# Patient Record
Sex: FEMALE | Race: WHITE | Employment: FULL TIME | URBAN - METROPOLITAN AREA
[De-identification: names, ages, dates, MRNs, and addresses within clinical notes are randomized per-mention and may not be internally consistent; named-entity substitution may affect disease eponyms.]

---

## 2018-03-02 ENCOUNTER — TELEPHONE (OUTPATIENT)
Dept: INTERNAL MEDICINE CLINIC | Facility: CLINIC | Age: 48
End: 2018-03-02

## 2018-03-02 NOTE — TELEPHONE ENCOUNTER
Incoming (mail or fax):  fax  Received from:  [facility]  Documentation given to:  Dr Nirmala Marin

## 2018-08-09 NOTE — TELEPHONE ENCOUNTER
Incoming (mail or fax):  Fax  Received from: [facility]  Documentation given to: Lucien Ramos

## 2018-11-29 ENCOUNTER — APPOINTMENT (OUTPATIENT)
Dept: CT IMAGING | Age: 48
End: 2018-11-29
Attending: EMERGENCY MEDICINE
Payer: COMMERCIAL

## 2018-11-29 ENCOUNTER — HOSPITAL ENCOUNTER (EMERGENCY)
Age: 48
Discharge: HOME | End: 2018-11-29
Attending: EMERGENCY MEDICINE
Payer: COMMERCIAL

## 2018-11-29 ENCOUNTER — APPOINTMENT (OUTPATIENT)
Dept: CV DIAGNOSTICS | Age: 48
End: 2018-11-29
Attending: EMERGENCY MEDICINE
Payer: COMMERCIAL

## 2018-11-29 VITALS
OXYGEN SATURATION: 98 % | TEMPERATURE: 99 F | RESPIRATION RATE: 20 BRPM | DIASTOLIC BLOOD PRESSURE: 48 MMHG | HEIGHT: 64 IN | WEIGHT: 235 LBS | BODY MASS INDEX: 40.12 KG/M2 | HEART RATE: 85 BPM | SYSTOLIC BLOOD PRESSURE: 104 MMHG

## 2018-11-29 DIAGNOSIS — R07.9 CHEST PAIN OF UNCERTAIN ETIOLOGY: Primary | ICD-10-CM

## 2018-11-29 PROCEDURE — 93018 CV STRESS TEST I&R ONLY: CPT | Performed by: EMERGENCY MEDICINE

## 2018-11-29 PROCEDURE — 93350 STRESS TTE ONLY: CPT | Performed by: EMERGENCY MEDICINE

## 2018-11-29 PROCEDURE — 81003 URINALYSIS AUTO W/O SCOPE: CPT | Performed by: EMERGENCY MEDICINE

## 2018-11-29 PROCEDURE — 93017 CV STRESS TEST TRACING ONLY: CPT | Performed by: EMERGENCY MEDICINE

## 2018-11-29 PROCEDURE — 85025 COMPLETE CBC W/AUTO DIFF WBC: CPT | Performed by: EMERGENCY MEDICINE

## 2018-11-29 PROCEDURE — 80053 COMPREHEN METABOLIC PANEL: CPT | Performed by: EMERGENCY MEDICINE

## 2018-11-29 PROCEDURE — 93005 ELECTROCARDIOGRAM TRACING: CPT

## 2018-11-29 PROCEDURE — 81025 URINE PREGNANCY TEST: CPT | Performed by: EMERGENCY MEDICINE

## 2018-11-29 PROCEDURE — 93010 ELECTROCARDIOGRAM REPORT: CPT | Performed by: EMERGENCY MEDICINE

## 2018-11-29 PROCEDURE — 96360 HYDRATION IV INFUSION INIT: CPT | Performed by: EMERGENCY MEDICINE

## 2018-11-29 PROCEDURE — 99285 EMERGENCY DEPT VISIT HI MDM: CPT | Performed by: EMERGENCY MEDICINE

## 2018-11-29 PROCEDURE — 71275 CT ANGIOGRAPHY CHEST: CPT | Performed by: EMERGENCY MEDICINE

## 2018-11-29 PROCEDURE — 84484 ASSAY OF TROPONIN QUANT: CPT | Performed by: EMERGENCY MEDICINE

## 2018-11-29 NOTE — ED PROVIDER NOTES
Patient Seen in: THE [facility] Emergency Department In [place]    History   Patient presents with:  Chest Pain Angina (cardiovascular)    Stated Complaint: chest heaviness    HPI    53-year-old female presents with chest discomfort and feeling \"off\".   She Current:/48   Pulse 85   Temp 98.7 °F (37.1 °C) (Temporal)   Resp 20   Ht 162.6 cm (5' 4\")   Wt 106.6 kg   SpO2 98%   BMI 40.34 kg/m²         Physical Exam    General:  Vitals as listed. Appears anxious  HEENT: Sclerae anicteric.   Conjunctiva ischemia              Ct Angiography, Chest (cpt=71275)    Result Date: 11/29/2018  CONCLUSION:  No CT evidence for pulmonary embolism. 4.5 mm subpleural nodule in the right lower lobe. Bibasilar atelectasis.   Dictated by: Ria Painter MD on 11/29/2018 [address] [address] [phone]    Schedule an appointment as soon as possible for a visit      Bakari Aguilar MD  [address] [address] [phone]      cardiolo

## 2020-12-04 ENCOUNTER — OFFICE VISIT (OUTPATIENT)
Dept: INTERNAL MEDICINE CLINIC | Facility: CLINIC | Age: 50
End: 2020-12-04
Payer: COMMERCIAL

## 2020-12-04 VITALS
OXYGEN SATURATION: 98 % | HEIGHT: 63.75 IN | TEMPERATURE: 97 F | RESPIRATION RATE: 14 BRPM | WEIGHT: 228.81 LBS | HEART RATE: 90 BPM | DIASTOLIC BLOOD PRESSURE: 68 MMHG | BODY MASS INDEX: 39.55 KG/M2 | SYSTOLIC BLOOD PRESSURE: 116 MMHG

## 2020-12-04 DIAGNOSIS — Z12.31 ENCOUNTER FOR SCREENING MAMMOGRAM FOR BREAST CANCER: ICD-10-CM

## 2020-12-04 DIAGNOSIS — K43.9 VENTRAL HERNIA WITHOUT OBSTRUCTION OR GANGRENE: Primary | ICD-10-CM

## 2020-12-04 DIAGNOSIS — Z01.89 ENCOUNTER FOR LABORATORY EXAMINATION: ICD-10-CM

## 2020-12-04 PROCEDURE — 3074F SYST BP LT 130 MM HG: CPT | Performed by: INTERNAL MEDICINE

## 2020-12-04 PROCEDURE — 3078F DIAST BP <80 MM HG: CPT | Performed by: INTERNAL MEDICINE

## 2020-12-04 PROCEDURE — 3008F BODY MASS INDEX DOCD: CPT | Performed by: INTERNAL MEDICINE

## 2020-12-04 PROCEDURE — 99203 OFFICE O/P NEW LOW 30 MIN: CPT | Performed by: INTERNAL MEDICINE

## 2020-12-04 NOTE — PROGRESS NOTES
Yovany Reilly is a 52year old female  Patient presents with:  Hernia: Abd Hernia - Changed Shape - Noticed change about 6 mos ago      HPI:   She has a hernia since childhood, ventral, was always very small and has noticed it has gotten bigger when sh screening mammogram for breast cancer    Orders Placed This Encounter      CMP      CBC With Differential With Platelet      Lipid Panel      TSH W Reflex To Free T4      Meds & Refills for this Visit:  Requested Prescriptions      No prescriptions request

## 2020-12-09 ENCOUNTER — HOSPITAL ENCOUNTER (OUTPATIENT)
Dept: MAMMOGRAPHY | Age: 50
Discharge: HOME | End: 2020-12-09
Attending: INTERNAL MEDICINE
Payer: COMMERCIAL

## 2020-12-09 DIAGNOSIS — Z12.31 ENCOUNTER FOR SCREENING MAMMOGRAM FOR BREAST CANCER: ICD-10-CM

## 2020-12-09 PROCEDURE — 77067 SCR MAMMO BI INCL CAD: CPT | Performed by: INTERNAL MEDICINE

## 2020-12-09 PROCEDURE — 77063 BREAST TOMOSYNTHESIS BI: CPT | Performed by: INTERNAL MEDICINE

## 2020-12-14 ENCOUNTER — HOSPITAL ENCOUNTER (OUTPATIENT)
Dept: ULTRASOUND IMAGING | Age: 50
Discharge: HOME | End: 2020-12-14
Attending: INTERNAL MEDICINE
Payer: COMMERCIAL

## 2020-12-14 ENCOUNTER — HOSPITAL ENCOUNTER (OUTPATIENT)
Dept: MAMMOGRAPHY | Age: 50
Discharge: HOME | End: 2020-12-14
Attending: INTERNAL MEDICINE
Payer: COMMERCIAL

## 2020-12-14 DIAGNOSIS — R92.2 INCONCLUSIVE MAMMOGRAM: ICD-10-CM

## 2020-12-14 DIAGNOSIS — R92.8 ABNORMAL MAMMOGRAM OF RIGHT BREAST: ICD-10-CM

## 2020-12-14 DIAGNOSIS — R92.8 ABNORMAL ULTRASOUND OF BREAST: Primary | ICD-10-CM

## 2020-12-14 PROCEDURE — 77066 DX MAMMO INCL CAD BI: CPT | Performed by: INTERNAL MEDICINE

## 2020-12-14 PROCEDURE — 76641 ULTRASOUND BREAST COMPLETE: CPT | Performed by: INTERNAL MEDICINE

## 2020-12-14 PROCEDURE — 77062 BREAST TOMOSYNTHESIS BI: CPT | Performed by: INTERNAL MEDICINE

## 2020-12-23 ENCOUNTER — LAB ENCOUNTER (OUTPATIENT)
Dept: LAB | Age: 50
End: 2020-12-23
Attending: INTERNAL MEDICINE
Payer: COMMERCIAL

## 2020-12-23 DIAGNOSIS — Z01.89 ENCOUNTER FOR LABORATORY EXAMINATION: ICD-10-CM

## 2020-12-23 PROCEDURE — 36415 COLL VENOUS BLD VENIPUNCTURE: CPT

## 2020-12-23 PROCEDURE — 84443 ASSAY THYROID STIM HORMONE: CPT

## 2020-12-23 PROCEDURE — 80061 LIPID PANEL: CPT

## 2020-12-23 PROCEDURE — 80053 COMPREHEN METABOLIC PANEL: CPT

## 2020-12-23 PROCEDURE — 85025 COMPLETE CBC W/AUTO DIFF WBC: CPT

## 2020-12-28 ENCOUNTER — OFFICE VISIT (OUTPATIENT)
Dept: SURGERY | Facility: CLINIC | Age: 50
End: 2020-12-28
Payer: COMMERCIAL

## 2020-12-28 VITALS
HEART RATE: 93 BPM | SYSTOLIC BLOOD PRESSURE: 129 MMHG | TEMPERATURE: 97 F | BODY MASS INDEX: 38.93 KG/M2 | WEIGHT: 228 LBS | DIASTOLIC BLOOD PRESSURE: 74 MMHG | HEIGHT: 64 IN

## 2020-12-28 DIAGNOSIS — K43.9 EPIGASTRIC HERNIA: Primary | ICD-10-CM

## 2020-12-28 PROCEDURE — 3078F DIAST BP <80 MM HG: CPT | Performed by: COLON & RECTAL SURGERY

## 2020-12-28 PROCEDURE — 3008F BODY MASS INDEX DOCD: CPT | Performed by: COLON & RECTAL SURGERY

## 2020-12-28 PROCEDURE — 99243 OFF/OP CNSLTJ NEW/EST LOW 30: CPT | Performed by: COLON & RECTAL SURGERY

## 2020-12-28 PROCEDURE — 3074F SYST BP LT 130 MM HG: CPT | Performed by: COLON & RECTAL SURGERY

## 2020-12-28 RX ORDER — AMOXICILLIN AND CLAVULANATE POTASSIUM 875; 125 MG/1; MG/1
TABLET, FILM COATED ORAL EVERY 12 HOURS
COMMUNITY
Start: 2020-12-26 | End: 2021-01-08

## 2020-12-28 NOTE — H&P
New Patient Visit Note       Active Problems      1.  Epigastric hernia        Chief Complaint   Patient presents with:  Hernia      History of Present Illness   Anika Sutton is a 54year old female referred by Dr. Spencer Stewart for evaluation of herni History    Tobacco Use      Smoking status: Never Smoker      Smokeless tobacco: Never Used    Alcohol use: Yes      Frequency: 2-4 times a month      Drinks per session: 5 or 6      Comment: social    Drug use: No     No current outpatient medications on with reducible contents, likely bowel. ~2 -3 cm palpable fascial defect. Musculoskeletal: Normal range of motion. General: No edema. Lymphadenopathy:     She has no cervical adenopathy.    Neurological: She is alert and oriented to person, place

## 2020-12-28 NOTE — H&P (VIEW-ONLY)
New Patient Visit Note       Active Problems      1.  Epigastric hernia        Chief Complaint   Patient presents with:  Hernia      History of Present Illness   Angie Patel is a 54year old female referred by Dr. Rivka Ramirez for evaluation of herni History    Tobacco Use      Smoking status: Never Smoker      Smokeless tobacco: Never Used    Alcohol use: Yes      Frequency: 2-4 times a month      Drinks per session: 5 or 6      Comment: social    Drug use: No     No current outpatient medications on with reducible contents, likely bowel. ~2 -3 cm palpable fascial defect. Musculoskeletal: Normal range of motion. General: No edema. Lymphadenopathy:     She has no cervical adenopathy.    Neurological: She is alert and oriented to person, place

## 2021-01-06 ENCOUNTER — TELEPHONE (OUTPATIENT)
Dept: SURGERY | Facility: CLINIC | Age: 51
End: 2021-01-06

## 2021-01-06 NOTE — TELEPHONE ENCOUNTER
XOJET and spoke with [facility] SERVICES for prior authorization for CPT 97368 CT ABDOMEN AND PELVIS W CONTRAST. Call was transferred to [facility]. Spoke with Justa Handley. Case approved. Valid from 1/6-7/5/2021. Auth number: [id number]. Referral updated.  Patient Vito Vasquez

## 2021-01-08 ENCOUNTER — HOSPITAL ENCOUNTER (OUTPATIENT)
Dept: CT IMAGING | Age: 51
Discharge: HOME | End: 2021-01-08
Attending: COLON & RECTAL SURGERY
Payer: COMMERCIAL

## 2021-01-08 DIAGNOSIS — K43.9 EPIGASTRIC HERNIA: ICD-10-CM

## 2021-01-08 PROCEDURE — 74177 CT ABD & PELVIS W/CONTRAST: CPT | Performed by: COLON & RECTAL SURGERY

## 2021-01-08 RX ORDER — ACETAMINOPHEN 500 MG
1000 TABLET ORAL ONCE
Status: CANCELLED | OUTPATIENT
Start: 2021-01-08 | End: 2021-01-08

## 2021-01-19 ENCOUNTER — LAB ENCOUNTER (OUTPATIENT)
Dept: LAB | Age: 51
End: 2021-01-19
Attending: COLON & RECTAL SURGERY
Payer: COMMERCIAL

## 2021-01-19 DIAGNOSIS — K43.9 EPIGASTRIC HERNIA: ICD-10-CM

## 2021-01-20 LAB — SARS-COV-2 RNA RESP QL NAA+PROBE: NOT DETECTED

## 2021-01-22 ENCOUNTER — ANESTHESIA EVENT (OUTPATIENT)
Dept: SURGERY | Facility: HOSPITAL | Age: 51
End: 2021-01-22
Payer: COMMERCIAL

## 2021-01-22 ENCOUNTER — ANESTHESIA (OUTPATIENT)
Dept: SURGERY | Facility: HOSPITAL | Age: 51
End: 2021-01-22
Payer: COMMERCIAL

## 2021-01-22 ENCOUNTER — HOSPITAL ENCOUNTER (OUTPATIENT)
Facility: HOSPITAL | Age: 51
Setting detail: HOSPITAL OUTPATIENT SURGERY
Discharge: HOME | End: 2021-01-22
Attending: COLON & RECTAL SURGERY | Admitting: COLON & RECTAL SURGERY
Payer: COMMERCIAL

## 2021-01-22 VITALS
SYSTOLIC BLOOD PRESSURE: 106 MMHG | TEMPERATURE: 98 F | RESPIRATION RATE: 16 BRPM | WEIGHT: 221.81 LBS | BODY MASS INDEX: 37.87 KG/M2 | OXYGEN SATURATION: 95 % | HEIGHT: 64 IN | HEART RATE: 68 BPM | DIASTOLIC BLOOD PRESSURE: 63 MMHG

## 2021-01-22 DIAGNOSIS — K43.9 EPIGASTRIC HERNIA: Primary | ICD-10-CM

## 2021-01-22 LAB
EXPIRATION DATE: 2022
POCT LOT NUMBER: NORMAL
POCT URINE PREGNANCY: NEGATIVE

## 2021-01-22 PROCEDURE — 8E0W4CZ ROBOTIC ASSISTED PROCEDURE OF TRUNK REGION, PERCUTANEOUS ENDOSCOPIC APPROACH: ICD-10-PCS | Performed by: COLON & RECTAL SURGERY

## 2021-01-22 PROCEDURE — 0WUF4JZ SUPPLEMENT ABDOMINAL WALL WITH SYNTHETIC SUBSTITUTE, PERCUTANEOUS ENDOSCOPIC APPROACH: ICD-10-PCS | Performed by: COLON & RECTAL SURGERY

## 2021-01-22 PROCEDURE — 49653 LAP VENT/ABD HERN PROC COMP: CPT | Performed by: COLON & RECTAL SURGERY

## 2021-01-22 PROCEDURE — 0WQF4ZZ REPAIR ABDOMINAL WALL, PERCUTANEOUS ENDOSCOPIC APPROACH: ICD-10-PCS | Performed by: COLON & RECTAL SURGERY

## 2021-01-22 PROCEDURE — 49653 LAP VENT/ABD HERN PROC COMP: CPT | Performed by: PHYSICIAN ASSISTANT

## 2021-01-22 DEVICE — VENTRALIGHT ST MESH
Type: IMPLANTABLE DEVICE | Site: ABDOMEN | Status: FUNCTIONAL
Brand: VENTRALIGHT ST

## 2021-01-22 RX ORDER — MEPERIDINE HYDROCHLORIDE 25 MG/ML
12.5 INJECTION INTRAMUSCULAR; INTRAVENOUS; SUBCUTANEOUS AS NEEDED
Status: DISCONTINUED | OUTPATIENT
Start: 2021-01-22 | End: 2021-01-22

## 2021-01-22 RX ORDER — GLYCOPYRROLATE 0.2 MG/ML
INJECTION, SOLUTION INTRAMUSCULAR; INTRAVENOUS AS NEEDED
Status: DISCONTINUED | OUTPATIENT
Start: 2021-01-22 | End: 2021-01-22 | Stop reason: SURG

## 2021-01-22 RX ORDER — DIPHENHYDRAMINE HYDROCHLORIDE 50 MG/ML
12.5 INJECTION INTRAMUSCULAR; INTRAVENOUS AS NEEDED
Status: DISCONTINUED | OUTPATIENT
Start: 2021-01-22 | End: 2021-01-22

## 2021-01-22 RX ORDER — HEPARIN SODIUM 5000 [USP'U]/ML
5000 INJECTION, SOLUTION INTRAVENOUS; SUBCUTANEOUS ONCE
Status: COMPLETED | OUTPATIENT
Start: 2021-01-22 | End: 2021-01-22

## 2021-01-22 RX ORDER — KETOROLAC TROMETHAMINE 30 MG/ML
INJECTION, SOLUTION INTRAMUSCULAR; INTRAVENOUS AS NEEDED
Status: DISCONTINUED | OUTPATIENT
Start: 2021-01-22 | End: 2021-01-22 | Stop reason: SURG

## 2021-01-22 RX ORDER — PHENYLEPHRINE HCL 10 MG/ML
VIAL (ML) INJECTION AS NEEDED
Status: DISCONTINUED | OUTPATIENT
Start: 2021-01-22 | End: 2021-01-22 | Stop reason: SURG

## 2021-01-22 RX ORDER — LIDOCAINE HYDROCHLORIDE 10 MG/ML
INJECTION, SOLUTION EPIDURAL; INFILTRATION; INTRACAUDAL; PERINEURAL AS NEEDED
Status: DISCONTINUED | OUTPATIENT
Start: 2021-01-22 | End: 2021-01-22 | Stop reason: SURG

## 2021-01-22 RX ORDER — ACETAMINOPHEN 500 MG
1000 TABLET ORAL EVERY 6 HOURS PRN
Status: DISCONTINUED | OUTPATIENT
Start: 2021-01-22 | End: 2021-01-22

## 2021-01-22 RX ORDER — MIDAZOLAM HYDROCHLORIDE 1 MG/ML
1 INJECTION INTRAMUSCULAR; INTRAVENOUS EVERY 5 MIN PRN
Status: DISCONTINUED | OUTPATIENT
Start: 2021-01-22 | End: 2021-01-22

## 2021-01-22 RX ORDER — ONDANSETRON 2 MG/ML
4 INJECTION INTRAMUSCULAR; INTRAVENOUS AS NEEDED
Status: DISCONTINUED | OUTPATIENT
Start: 2021-01-22 | End: 2021-01-22

## 2021-01-22 RX ORDER — SODIUM CHLORIDE, SODIUM LACTATE, POTASSIUM CHLORIDE, CALCIUM CHLORIDE 600; 310; 30; 20 MG/100ML; MG/100ML; MG/100ML; MG/100ML
INJECTION, SOLUTION INTRAVENOUS CONTINUOUS
Status: DISCONTINUED | OUTPATIENT
Start: 2021-01-22 | End: 2021-01-22

## 2021-01-22 RX ORDER — MIDAZOLAM HYDROCHLORIDE 1 MG/ML
INJECTION INTRAMUSCULAR; INTRAVENOUS AS NEEDED
Status: DISCONTINUED | OUTPATIENT
Start: 2021-01-22 | End: 2021-01-22 | Stop reason: SURG

## 2021-01-22 RX ORDER — HYDROMORPHONE HYDROCHLORIDE 1 MG/ML
0.4 INJECTION, SOLUTION INTRAMUSCULAR; INTRAVENOUS; SUBCUTANEOUS EVERY 5 MIN PRN
Status: DISCONTINUED | OUTPATIENT
Start: 2021-01-22 | End: 2021-01-22

## 2021-01-22 RX ORDER — NALOXONE HYDROCHLORIDE 0.4 MG/ML
80 INJECTION, SOLUTION INTRAMUSCULAR; INTRAVENOUS; SUBCUTANEOUS AS NEEDED
Status: DISCONTINUED | OUTPATIENT
Start: 2021-01-22 | End: 2021-01-22

## 2021-01-22 RX ORDER — HYDROCODONE BITARTRATE AND ACETAMINOPHEN 5; 325 MG/1; MG/1
1 TABLET ORAL AS NEEDED
Status: DISCONTINUED | OUTPATIENT
Start: 2021-01-22 | End: 2021-01-22

## 2021-01-22 RX ORDER — DEXAMETHASONE SODIUM PHOSPHATE 4 MG/ML
VIAL (ML) INJECTION AS NEEDED
Status: DISCONTINUED | OUTPATIENT
Start: 2021-01-22 | End: 2021-01-22 | Stop reason: SURG

## 2021-01-22 RX ORDER — METOCLOPRAMIDE HYDROCHLORIDE 5 MG/ML
10 INJECTION INTRAMUSCULAR; INTRAVENOUS AS NEEDED
Status: DISCONTINUED | OUTPATIENT
Start: 2021-01-22 | End: 2021-01-22

## 2021-01-22 RX ORDER — ROCURONIUM BROMIDE 10 MG/ML
INJECTION, SOLUTION INTRAVENOUS AS NEEDED
Status: DISCONTINUED | OUTPATIENT
Start: 2021-01-22 | End: 2021-01-22 | Stop reason: SURG

## 2021-01-22 RX ORDER — BUPIVACAINE HYDROCHLORIDE AND EPINEPHRINE 2.5; 5 MG/ML; UG/ML
INJECTION, SOLUTION EPIDURAL; INFILTRATION; INTRACAUDAL; PERINEURAL AS NEEDED
Status: DISCONTINUED | OUTPATIENT
Start: 2021-01-22 | End: 2021-01-22

## 2021-01-22 RX ORDER — OXYCODONE HYDROCHLORIDE 5 MG/1
5 TABLET ORAL EVERY 6 HOURS PRN
Qty: 15 TABLET | Refills: 0 | Status: SHIPPED | OUTPATIENT
Start: 2021-01-22 | End: 2021-02-01

## 2021-01-22 RX ORDER — CEFAZOLIN SODIUM/WATER 2 G/20 ML
2 SYRINGE (ML) INTRAVENOUS ONCE
Status: COMPLETED | OUTPATIENT
Start: 2021-01-22 | End: 2021-01-22

## 2021-01-22 RX ORDER — NEOSTIGMINE METHYLSULFATE 1 MG/ML
INJECTION INTRAVENOUS AS NEEDED
Status: DISCONTINUED | OUTPATIENT
Start: 2021-01-22 | End: 2021-01-22 | Stop reason: SURG

## 2021-01-22 RX ORDER — ACETAMINOPHEN 500 MG
1000 TABLET ORAL ONCE
COMMUNITY
End: 2021-02-01 | Stop reason: ALTCHOICE

## 2021-01-22 RX ORDER — HYDROCODONE BITARTRATE AND ACETAMINOPHEN 5; 325 MG/1; MG/1
2 TABLET ORAL AS NEEDED
Status: DISCONTINUED | OUTPATIENT
Start: 2021-01-22 | End: 2021-01-22

## 2021-01-22 RX ORDER — ONDANSETRON 2 MG/ML
INJECTION INTRAMUSCULAR; INTRAVENOUS AS NEEDED
Status: DISCONTINUED | OUTPATIENT
Start: 2021-01-22 | End: 2021-01-22 | Stop reason: SURG

## 2021-01-22 RX ORDER — HYDROMORPHONE HYDROCHLORIDE 1 MG/ML
INJECTION, SOLUTION INTRAMUSCULAR; INTRAVENOUS; SUBCUTANEOUS
Status: COMPLETED
Start: 2021-01-22 | End: 2021-01-22

## 2021-01-22 RX ORDER — EPHEDRINE SULFATE 50 MG/ML
INJECTION INTRAVENOUS AS NEEDED
Status: DISCONTINUED | OUTPATIENT
Start: 2021-01-22 | End: 2021-01-22 | Stop reason: SURG

## 2021-01-22 RX ADMIN — ROCURONIUM BROMIDE 20 MG: 10 INJECTION, SOLUTION INTRAVENOUS at 10:06:00

## 2021-01-22 RX ADMIN — DEXAMETHASONE SODIUM PHOSPHATE 8 MG: 4 MG/ML VIAL (ML) INJECTION at 08:03:00

## 2021-01-22 RX ADMIN — ROCURONIUM BROMIDE 10 MG: 10 INJECTION, SOLUTION INTRAVENOUS at 08:20:00

## 2021-01-22 RX ADMIN — GLYCOPYRROLATE 0.6 MG: 0.2 INJECTION, SOLUTION INTRAMUSCULAR; INTRAVENOUS at 11:13:00

## 2021-01-22 RX ADMIN — LIDOCAINE HYDROCHLORIDE 50 MG: 10 INJECTION, SOLUTION EPIDURAL; INFILTRATION; INTRACAUDAL; PERINEURAL at 07:50:00

## 2021-01-22 RX ADMIN — SODIUM CHLORIDE, SODIUM LACTATE, POTASSIUM CHLORIDE, CALCIUM CHLORIDE: 600; 310; 30; 20 INJECTION, SOLUTION INTRAVENOUS at 11:28:00

## 2021-01-22 RX ADMIN — NEOSTIGMINE METHYLSULFATE 4 MG: 1 INJECTION INTRAVENOUS at 11:13:00

## 2021-01-22 RX ADMIN — ROCURONIUM BROMIDE 20 MG: 10 INJECTION, SOLUTION INTRAVENOUS at 09:36:00

## 2021-01-22 RX ADMIN — ROCURONIUM BROMIDE 10 MG: 10 INJECTION, SOLUTION INTRAVENOUS at 10:50:00

## 2021-01-22 RX ADMIN — CEFAZOLIN SODIUM/WATER 2 G: 2 G/20 ML SYRINGE (ML) INTRAVENOUS at 08:00:00

## 2021-01-22 RX ADMIN — EPHEDRINE SULFATE 10 MG: 50 INJECTION INTRAVENOUS at 08:23:00

## 2021-01-22 RX ADMIN — SODIUM CHLORIDE, SODIUM LACTATE, POTASSIUM CHLORIDE, CALCIUM CHLORIDE: 600; 310; 30; 20 INJECTION, SOLUTION INTRAVENOUS at 09:01:00

## 2021-01-22 RX ADMIN — ROCURONIUM BROMIDE 20 MG: 10 INJECTION, SOLUTION INTRAVENOUS at 09:00:00

## 2021-01-22 RX ADMIN — PHENYLEPHRINE HCL 50 MCG: 10 MG/ML VIAL (ML) INJECTION at 08:20:00

## 2021-01-22 RX ADMIN — GLYCOPYRROLATE 0.2 MG: 0.2 INJECTION, SOLUTION INTRAMUSCULAR; INTRAVENOUS at 08:23:00

## 2021-01-22 RX ADMIN — MIDAZOLAM HYDROCHLORIDE 2 MG: 1 INJECTION INTRAMUSCULAR; INTRAVENOUS at 07:47:00

## 2021-01-22 RX ADMIN — KETOROLAC TROMETHAMINE 30 MG: 30 INJECTION, SOLUTION INTRAMUSCULAR; INTRAVENOUS at 11:13:00

## 2021-01-22 RX ADMIN — ONDANSETRON 4 MG: 2 INJECTION INTRAMUSCULAR; INTRAVENOUS at 11:13:00

## 2021-01-22 RX ADMIN — ROCURONIUM BROMIDE 50 MG: 10 INJECTION, SOLUTION INTRAVENOUS at 07:50:00

## 2021-01-22 NOTE — OPERATIVE REPORT
[facility]  Operative Note    Kari Charlevoix Location: OR   [facility] [id number] MRN [id number]    1970 Age 54year old   Admission Date 2021 Operation Date 2021   Attending Physician Evelyn Newton MD Operating Physician Milvia Bradley position. General endotracheal anesthesia was administered. The abdomen was prepped and draped in sterile fashion. Pre-operative antibiotics were given. A time-out was performed.     In the left upper quadrant an 5mm incision was made and 5mm Optical port w recovered from the abdomen. The instruments were removed. The 12 mm port was closed with #1 PDS using a suture passer under direct laparoscopic visualization.   A bilateral transversus abdominis plane block was performed under direct laparoscopic visualiza

## 2021-01-22 NOTE — ANESTHESIA POSTPROCEDURE EVALUATION
[address] [place] [address] Patient Status:  Hospital Outpatient Surgery   Age/Gender 54year old female MRN [id number]   [facility] SURGERY Attending Jason Lai MD   Hosp Day # 0 PCP Jacklyn Soliz MD       Anesth

## 2021-01-22 NOTE — ANESTHESIA PROCEDURE NOTES
Airway  Date/Time: 1/22/2021 7:54 AM  Urgency: elective    Airway not difficult    General Information and Staff    Patient location during procedure: OR  Anesthesiologist: Chris Patterson MD  Resident/CRNA: Garfield Mak CRNA  Performed: CRNA     Ind

## 2021-01-22 NOTE — INTERVAL H&P NOTE
Pre-op Diagnosis: Epigastric hernia [K43.9]    The above referenced H&P was reviewed by Fred Eckert MD on 1/22/2021, the patient was examined and no significant changes have occurred in the patient's condition since the H&P was performed.   I disc

## 2021-01-22 NOTE — ANESTHESIA PREPROCEDURE EVALUATION
PRE-OP EVALUATION    Patient Name: Nohemi Carpenter    Pre-op Diagnosis: Epigastric hernia [K43.9]    Procedure(s):  ROBOTIC VENTRAL HERNIA REPAIR WITH MESH    Surgeon(s) and Role:     * Kd Watkins MD - Primary    Pre-op vitals reviewed.   Temp HGB 14.3 12/23/2020    HCT 43.6 12/23/2020    MCV 80.4 12/23/2020    MCH 26.4 12/23/2020    MCHC 32.8 12/23/2020    RDW 13.9 12/23/2020    .0 12/23/2020     Lab Results   Component Value Date     12/23/2020    K 4.0 12/23/2020     12/23/

## 2021-02-01 ENCOUNTER — OFFICE VISIT (OUTPATIENT)
Dept: INTERNAL MEDICINE CLINIC | Facility: CLINIC | Age: 51
End: 2021-02-01
Payer: COMMERCIAL

## 2021-02-01 VITALS
RESPIRATION RATE: 14 BRPM | BODY MASS INDEX: 38.75 KG/M2 | TEMPERATURE: 98 F | OXYGEN SATURATION: 96 % | DIASTOLIC BLOOD PRESSURE: 62 MMHG | WEIGHT: 224.19 LBS | HEIGHT: 63.66 IN | SYSTOLIC BLOOD PRESSURE: 118 MMHG | HEART RATE: 83 BPM

## 2021-02-01 DIAGNOSIS — Z00.00 ROUTINE GENERAL MEDICAL EXAMINATION AT A HEALTH CARE FACILITY: Primary | ICD-10-CM

## 2021-02-01 DIAGNOSIS — N92.0 MENORRHAGIA WITH REGULAR CYCLE: ICD-10-CM

## 2021-02-01 DIAGNOSIS — Z12.4 CERVICAL CANCER SCREENING: ICD-10-CM

## 2021-02-01 DIAGNOSIS — Z12.31 BREAST CANCER SCREENING BY MAMMOGRAM: ICD-10-CM

## 2021-02-01 DIAGNOSIS — L98.9 FACIAL LESION: ICD-10-CM

## 2021-02-01 PROCEDURE — 3074F SYST BP LT 130 MM HG: CPT | Performed by: INTERNAL MEDICINE

## 2021-02-01 PROCEDURE — 87624 HPV HI-RISK TYP POOLED RSLT: CPT | Performed by: INTERNAL MEDICINE

## 2021-02-01 PROCEDURE — 3078F DIAST BP <80 MM HG: CPT | Performed by: INTERNAL MEDICINE

## 2021-02-01 PROCEDURE — 99396 PREV VISIT EST AGE 40-64: CPT | Performed by: INTERNAL MEDICINE

## 2021-02-01 PROCEDURE — 88175 CYTOPATH C/V AUTO FLUID REDO: CPT | Performed by: INTERNAL MEDICINE

## 2021-02-01 PROCEDURE — 3008F BODY MASS INDEX DOCD: CPT | Performed by: INTERNAL MEDICINE

## 2021-02-01 RX ORDER — OMEGA-3 FATTY ACIDS/FISH OIL 300-1000MG
400 CAPSULE ORAL AS NEEDED
COMMUNITY
End: 2021-04-12 | Stop reason: ALTCHOICE

## 2021-02-01 RX ORDER — ACETAMINOPHEN 500 MG
1000 TABLET ORAL EVERY 6 HOURS PRN
COMMUNITY
End: 2021-04-12 | Stop reason: ALTCHOICE

## 2021-02-01 NOTE — PROGRESS NOTES
HPI:   Jordan Blackburn is a 54year old female who presents for a complete physical exam. . Patient complains of post op ventra hernia, doing OK.      Wt Readings from Last 6 Encounters:  02/01/21 : 224 lb 3.2 oz (101.7 kg)  01/22/21 : 221 lb 12.5 oz (100 neck and some lesions on face  EYES:denies visual disturbances, or blurred vision, eye discharge, redness or irritation  HEENT: denies nasal congestion, sinus pain , recurrent STs or swollen glands, hearing issues, tinnitus or vertigo  LUNGS: denies shortn tenderness  :introitus is normal,scant discharge,cervix is pink,no adnexal masses or tenderness, no uterine enlargement or masses  MUSCULOSKELETAL: back is not tender,FROM of the back  EXTREMITIES: no cyanosis, clubbing or edema, no varicosities or stasi

## 2021-02-03 ENCOUNTER — OFFICE VISIT (OUTPATIENT)
Dept: SURGERY | Facility: CLINIC | Age: 51
End: 2021-02-03

## 2021-02-03 VITALS — BODY MASS INDEX: 37.73 KG/M2 | WEIGHT: 221 LBS | HEIGHT: 64 IN | TEMPERATURE: 97 F

## 2021-02-03 DIAGNOSIS — K43.9 EPIGASTRIC HERNIA: Primary | ICD-10-CM

## 2021-02-03 PROCEDURE — 99024 POSTOP FOLLOW-UP VISIT: CPT | Performed by: PHYSICIAN ASSISTANT

## 2021-02-03 PROCEDURE — 3008F BODY MASS INDEX DOCD: CPT | Performed by: PHYSICIAN ASSISTANT

## 2021-02-03 NOTE — PROGRESS NOTES
Post Operative Visit Note       Active Problems  1. Epigastric hernia         Chief Complaint   Patient presents with:  Post-Op: PO 1/22 [facility]KA - ROBOTIC VENTRAL HERNIA REPAIR x3. PT has some discomfort, denies bleeding, fever or chills.          History of Pr High Blood Pressure Other         fam hx   • Hypertension Maternal Grandmother    • Hypertension Father    • Other (Lung Cancer) Father 67   • Hypertension Mother    • Diabetes Mother    • No Known Problems Maternal Grandfather    • Diabetes Paternal Sherman bleeding, blood in stool, constipation, diarrhea, nausea and vomiting. Genitourinary: Negative for difficulty urinating, dysuria, frequency and urgency. Musculoskeletal: Negative for arthralgias and myalgias. Skin: Negative for color change and rash.

## 2021-02-04 LAB — HPV I/H RISK 1 DNA SPEC QL NAA+PROBE: NEGATIVE

## 2021-04-05 ENCOUNTER — IMMUNIZATION (OUTPATIENT)
Dept: LAB | Age: 51
End: 2021-04-05
Attending: HOSPITALIST
Payer: COMMERCIAL

## 2021-04-05 DIAGNOSIS — Z23 NEED FOR VACCINATION: Primary | ICD-10-CM

## 2021-04-05 PROCEDURE — 0001A SARSCOV2 VAC 30MCG/0.3ML IM: CPT

## 2021-04-12 ENCOUNTER — OFFICE VISIT (OUTPATIENT)
Dept: INTERNAL MEDICINE CLINIC | Facility: CLINIC | Age: 51
End: 2021-04-12
Payer: COMMERCIAL

## 2021-04-12 VITALS
HEIGHT: 64 IN | BODY MASS INDEX: 37.59 KG/M2 | TEMPERATURE: 97 F | DIASTOLIC BLOOD PRESSURE: 74 MMHG | OXYGEN SATURATION: 97 % | HEART RATE: 81 BPM | WEIGHT: 220.19 LBS | RESPIRATION RATE: 16 BRPM | SYSTOLIC BLOOD PRESSURE: 122 MMHG

## 2021-04-12 DIAGNOSIS — N63.11 BREAST LUMP ON RIGHT SIDE AT 10 O'CLOCK POSITION: Primary | ICD-10-CM

## 2021-04-12 PROCEDURE — 3074F SYST BP LT 130 MM HG: CPT | Performed by: INTERNAL MEDICINE

## 2021-04-12 PROCEDURE — 99213 OFFICE O/P EST LOW 20 MIN: CPT | Performed by: INTERNAL MEDICINE

## 2021-04-12 PROCEDURE — 3078F DIAST BP <80 MM HG: CPT | Performed by: INTERNAL MEDICINE

## 2021-04-12 PROCEDURE — 3008F BODY MASS INDEX DOCD: CPT | Performed by: INTERNAL MEDICINE

## 2021-04-12 NOTE — PROGRESS NOTES
Avie Hodgkins is a 54year old female  Patient presents with:  Breast Lump: Right Breast - 9-10 o'clock position, painful to touch not during movement      HPI:   Hard area of right breast 2 weeks ago,   Tender if pushes very hard  Menses 3 days ago an axilla. At the 11 o'clock position 10 cm from nipple there is a hypoechoic lesion measuring 0.7 x 0.7 x 0.8 cm. This is felt to correspond with the previously biopsied nodule from 2012 which was described as 10 o'clock position 6 cm from the nipple.

## 2021-04-28 ENCOUNTER — LAB ENCOUNTER (OUTPATIENT)
Dept: LAB | Age: 51
End: 2021-04-28
Attending: NURSE PRACTITIONER
Payer: COMMERCIAL

## 2021-04-28 ENCOUNTER — TELEPHONE (OUTPATIENT)
Dept: INTERNAL MEDICINE CLINIC | Facility: CLINIC | Age: 51
End: 2021-04-28

## 2021-04-28 ENCOUNTER — TELEMEDICINE (OUTPATIENT)
Dept: INTERNAL MEDICINE CLINIC | Facility: CLINIC | Age: 51
End: 2021-04-28

## 2021-04-28 VITALS — WEIGHT: 220 LBS | BODY MASS INDEX: 37.56 KG/M2 | HEIGHT: 64 IN

## 2021-04-28 DIAGNOSIS — R51.9 ACUTE INTRACTABLE HEADACHE, UNSPECIFIED HEADACHE TYPE: ICD-10-CM

## 2021-04-28 DIAGNOSIS — R51.9 ACUTE INTRACTABLE HEADACHE, UNSPECIFIED HEADACHE TYPE: Primary | ICD-10-CM

## 2021-04-28 PROCEDURE — 99214 OFFICE O/P EST MOD 30 MIN: CPT | Performed by: NURSE PRACTITIONER

## 2021-04-28 PROCEDURE — 3008F BODY MASS INDEX DOCD: CPT | Performed by: NURSE PRACTITIONER

## 2021-04-28 RX ORDER — ACETAMINOPHEN 500 MG
500 TABLET ORAL AS NEEDED
COMMUNITY

## 2021-04-28 NOTE — TELEPHONE ENCOUNTER
Patient has had headaches for the last week and is starting to get concerned lana since she is scheduled to get the second dose of the COVID vaccine on Saturday.     Virtual/Telephone Consent    Haider Boudreaux verbally consents to a Virtual/Telephone Chec

## 2021-04-28 NOTE — PATIENT INSTRUCTIONS
Get your COVID test done    Get your CT of the head completed. Follow up in the office if your symptoms continue and your COVID test is negative.

## 2021-04-28 NOTE — PROGRESS NOTES
Virtual video [id number] verbally consents to a Virtual/video  Check-In visit on 04/28/21. Patient has been referred to the [facility] website at [email or web address] to review the yearly Consent to Treat document.     Patient understands • Sinusitis    • URI (upper respiratory infection)    • Viral illness       Social History:  Social History    Tobacco Use      Smoking status: Never Smoker      Smokeless tobacco: Never Used    Vaping Use      Vaping Use: Never used    Alcohol use:  Yes ordered to eval further. - GO to ER is any neuro deficits occur. If continues make an OV for further eval.   - SARS-COV-2 BY PCR (ALINITY); Future  - CT BRAIN OR HEAD (63388);  Future    The patient indicates understanding of these issues and agrees to th

## 2021-05-01 ENCOUNTER — IMMUNIZATION (OUTPATIENT)
Dept: LAB | Age: 51
End: 2021-05-01
Attending: HOSPITALIST
Payer: COMMERCIAL

## 2021-05-01 DIAGNOSIS — Z23 NEED FOR VACCINATION: Primary | ICD-10-CM

## 2021-05-01 PROCEDURE — 0002A SARSCOV2 VAC 30MCG/0.3ML IM: CPT

## 2021-05-03 ENCOUNTER — HOSPITAL ENCOUNTER (OUTPATIENT)
Dept: CT IMAGING | Age: 51
Discharge: HOME | End: 2021-05-03
Attending: NURSE PRACTITIONER
Payer: COMMERCIAL

## 2021-05-03 DIAGNOSIS — R51.9 ACUTE INTRACTABLE HEADACHE, UNSPECIFIED HEADACHE TYPE: ICD-10-CM

## 2021-05-03 PROCEDURE — 70450 CT HEAD/BRAIN W/O DYE: CPT | Performed by: NURSE PRACTITIONER

## 2021-05-18 ENCOUNTER — OFFICE VISIT (OUTPATIENT)
Dept: INTERNAL MEDICINE CLINIC | Facility: CLINIC | Age: 51
End: 2021-05-18
Payer: COMMERCIAL

## 2021-05-18 VITALS
SYSTOLIC BLOOD PRESSURE: 116 MMHG | BODY MASS INDEX: 37.81 KG/M2 | RESPIRATION RATE: 14 BRPM | WEIGHT: 221.5 LBS | TEMPERATURE: 97 F | OXYGEN SATURATION: 98 % | DIASTOLIC BLOOD PRESSURE: 68 MMHG | HEART RATE: 84 BPM | HEIGHT: 64 IN

## 2021-05-18 DIAGNOSIS — N63.11 BREAST LUMP ON RIGHT SIDE AT 10 O'CLOCK POSITION: Primary | ICD-10-CM

## 2021-05-18 PROCEDURE — 3008F BODY MASS INDEX DOCD: CPT | Performed by: INTERNAL MEDICINE

## 2021-05-18 PROCEDURE — 3074F SYST BP LT 130 MM HG: CPT | Performed by: INTERNAL MEDICINE

## 2021-05-18 PROCEDURE — 3078F DIAST BP <80 MM HG: CPT | Performed by: INTERNAL MEDICINE

## 2021-05-18 PROCEDURE — 99213 OFFICE O/P EST LOW 20 MIN: CPT | Performed by: INTERNAL MEDICINE

## 2021-05-18 NOTE — PROGRESS NOTES
Nadia Flores is a 54year old female.  To F/U from last visit regarding right breast lump  HPI:   Pt was here 1 month ago w/ a slightly tender new lump right breast. Her US and MG in December had revealed quite a bit of breast cystic disease in the are Imaging & Consults:  None    No follow-ups on file. There are no Patient Instructions on file for this visit. The patient indicates understanding of these issues and agrees to the plan.

## 2021-06-25 ENCOUNTER — HOSPITAL ENCOUNTER (OUTPATIENT)
Dept: ULTRASOUND IMAGING | Age: 51
Discharge: HOME | End: 2021-06-25
Attending: INTERNAL MEDICINE
Payer: COMMERCIAL

## 2021-06-25 DIAGNOSIS — N63.11 BREAST LUMP ON RIGHT SIDE AT 10 O'CLOCK POSITION: ICD-10-CM

## 2021-06-25 PROCEDURE — 76642 ULTRASOUND BREAST LIMITED: CPT | Performed by: INTERNAL MEDICINE

## 2021-06-29 NOTE — PROGRESS NOTES
Patient MyChart result sent, will monitor to ensure patient viewed. Mick screening in 6 months ordered.

## 2021-10-21 ENCOUNTER — TELEMEDICINE (OUTPATIENT)
Dept: INTERNAL MEDICINE CLINIC | Facility: CLINIC | Age: 51
End: 2021-10-21

## 2021-10-21 VITALS — HEIGHT: 64 IN | BODY MASS INDEX: 38 KG/M2

## 2021-10-21 DIAGNOSIS — R39.9 UTI SYMPTOMS: Primary | ICD-10-CM

## 2021-10-21 PROCEDURE — 99213 OFFICE O/P EST LOW 20 MIN: CPT | Performed by: NURSE PRACTITIONER

## 2021-10-21 RX ORDER — CEPHALEXIN 500 MG/1
500 CAPSULE ORAL 3 TIMES DAILY
Qty: 21 CAPSULE | Refills: 0 | Status: SHIPPED | OUTPATIENT
Start: 2021-10-21 | End: 2021-10-28

## 2021-10-21 NOTE — PROGRESS NOTES
Virtual video [address] [facility] verbally consents to a Virtual/video Check-In visit on 10/21/21. Patient has been referred to the [facility] website at [email or web address] to review the yearly Consent to Treat document.     Patient understands visit.  NEURO: Oriented times three      LABS:      Lab Results   Component Value Date    WBC 11.8 (H) 12/23/2020    RBC 5.42 (H) 12/23/2020    HGB 14.3 12/23/2020    HCT 43.6 12/23/2020    MCV 80.4 12/23/2020    MCH 26.4 12/23/2020    MCHC 32.8 12/23/2020 continuity of care in the best interest of the provider-patient relationship, due to the ongoing public health crisis/national emergency and because of restrictions of visitation. There are limitations of this visit as no physical exam could be performed.

## 2021-10-21 NOTE — PATIENT INSTRUCTIONS
Get your urine sample sent to lab    Take antibiotic completely as ordered     Take antibiotic with food    Eat yogurt twice a day while on antibiotic or take an oral probiotic    Monitor for diarrhea, side effects, allergy    Follow up in one week as need bowels. The bacteria get onto the skin around the opening of the urethra. From there, they can get into the urine and travel up to the bladder, causing inflammation and infection. This usually happens because of:  · Wiping improperly after urinating.  Alway loose-fitting clothes and cotton underwear. Avoid tight-fitting pants. · Improve your diet and prevent constipation. Eat more fresh fruit and vegetables, and fiber, and less junk and fatty foods. · Avoid sex until your symptoms are gone.   · Avoid caffein

## 2021-10-21 NOTE — PROGRESS NOTES
Called pt to gather information regarding VV today at 2 p.m. Pt stated she was on a work call and only had 2 seconds to cover any questions. She stated she e checked in and updated her chart.  No other information was gathered about the visit and medicati

## 2021-10-22 ENCOUNTER — LAB ENCOUNTER (OUTPATIENT)
Dept: LAB | Age: 51
End: 2021-10-22
Attending: NURSE PRACTITIONER
Payer: COMMERCIAL

## 2021-10-22 DIAGNOSIS — R39.9 UTI SYMPTOMS: ICD-10-CM

## 2021-10-22 PROCEDURE — 87086 URINE CULTURE/COLONY COUNT: CPT

## 2021-10-22 PROCEDURE — 81001 URINALYSIS AUTO W/SCOPE: CPT

## 2021-10-26 NOTE — PROGRESS NOTES
Spoke to patient, aware of results and recommendations. Patient voice understandings. Patient is still taking the medication, states she is not feeling as much pressure and urine is less cloudy.  She stated she is feeling better but not 100% back to nor

## 2021-12-14 ENCOUNTER — HOSPITAL ENCOUNTER (OUTPATIENT)
Dept: MAMMOGRAPHY | Age: 51
Discharge: HOME | End: 2021-12-14
Attending: INTERNAL MEDICINE
Payer: COMMERCIAL

## 2021-12-14 DIAGNOSIS — Z12.31 BREAST CANCER SCREENING BY MAMMOGRAM: ICD-10-CM

## 2021-12-14 PROCEDURE — 77067 SCR MAMMO BI INCL CAD: CPT | Performed by: INTERNAL MEDICINE

## 2021-12-14 PROCEDURE — 77063 BREAST TOMOSYNTHESIS BI: CPT | Performed by: INTERNAL MEDICINE

## 2021-12-23 ENCOUNTER — HOSPITAL ENCOUNTER (OUTPATIENT)
Dept: MAMMOGRAPHY | Facility: HOSPITAL | Age: 51
Discharge: HOME | End: 2021-12-23
Attending: INTERNAL MEDICINE
Payer: COMMERCIAL

## 2021-12-23 DIAGNOSIS — R92.2 INCONCLUSIVE MAMMOGRAM: ICD-10-CM

## 2021-12-23 PROCEDURE — 76641 ULTRASOUND BREAST COMPLETE: CPT | Performed by: INTERNAL MEDICINE

## 2023-03-17 ENCOUNTER — OFFICE VISIT (OUTPATIENT)
Dept: INTERNAL MEDICINE CLINIC | Facility: CLINIC | Age: 53
End: 2023-03-17
Payer: COMMERCIAL

## 2023-03-17 VITALS
DIASTOLIC BLOOD PRESSURE: 70 MMHG | HEIGHT: 64 IN | RESPIRATION RATE: 16 BRPM | SYSTOLIC BLOOD PRESSURE: 120 MMHG | WEIGHT: 240 LBS | BODY MASS INDEX: 40.97 KG/M2 | TEMPERATURE: 98 F | HEART RATE: 72 BPM | OXYGEN SATURATION: 99 %

## 2023-03-17 DIAGNOSIS — D72.829 LEUKOCYTOSIS, UNSPECIFIED TYPE: ICD-10-CM

## 2023-03-17 DIAGNOSIS — R39.15 URINARY URGENCY: ICD-10-CM

## 2023-03-17 DIAGNOSIS — Z12.31 ENCOUNTER FOR SCREENING MAMMOGRAM FOR BREAST CANCER: ICD-10-CM

## 2023-03-17 DIAGNOSIS — J01.00 ACUTE NON-RECURRENT MAXILLARY SINUSITIS: Primary | ICD-10-CM

## 2023-03-17 LAB
APPEARANCE: CLEAR
BILIRUBIN: NEGATIVE
GLUCOSE (URINE DIPSTICK): NEGATIVE MG/DL
KETONES (URINE DIPSTICK): NEGATIVE MG/DL
LEUKOCYTES: NEGATIVE
MULTISTIX LOT#: NORMAL NUMERIC
NITRITE, URINE: NEGATIVE
OCCULT BLOOD: NEGATIVE
PH, URINE: 6 (ref 4.5–8)
PROTEIN (URINE DIPSTICK): NEGATIVE MG/DL
SPECIFIC GRAVITY: 1.01 (ref 1–1.03)
URINE-COLOR: YELLOW
UROBILINOGEN,SEMI-QN: 0.2 MG/DL (ref 0–1.9)

## 2023-03-17 PROCEDURE — 3078F DIAST BP <80 MM HG: CPT | Performed by: NURSE PRACTITIONER

## 2023-03-17 PROCEDURE — 81003 URINALYSIS AUTO W/O SCOPE: CPT | Performed by: NURSE PRACTITIONER

## 2023-03-17 PROCEDURE — 87086 URINE CULTURE/COLONY COUNT: CPT | Performed by: NURSE PRACTITIONER

## 2023-03-17 PROCEDURE — 3074F SYST BP LT 130 MM HG: CPT | Performed by: NURSE PRACTITIONER

## 2023-03-17 PROCEDURE — 99214 OFFICE O/P EST MOD 30 MIN: CPT | Performed by: NURSE PRACTITIONER

## 2023-03-17 PROCEDURE — 3008F BODY MASS INDEX DOCD: CPT | Performed by: NURSE PRACTITIONER

## 2023-03-17 RX ORDER — SEMAGLUTIDE 0.25 MG/.5ML
INJECTION, SOLUTION SUBCUTANEOUS
COMMUNITY
Start: 2023-03-15

## 2023-03-17 RX ORDER — AMOXICILLIN AND CLAVULANATE POTASSIUM 875; 125 MG/1; MG/1
1 TABLET, FILM COATED ORAL 2 TIMES DAILY
Qty: 20 TABLET | Refills: 0 | Status: SHIPPED | OUTPATIENT
Start: 2023-03-17 | End: 2023-03-27

## 2023-03-17 RX ORDER — ONDANSETRON 4 MG/1
TABLET, ORALLY DISINTEGRATING ORAL
COMMUNITY
Start: 2023-03-14

## 2023-06-14 ENCOUNTER — HOSPITAL ENCOUNTER (OUTPATIENT)
Dept: MAMMOGRAPHY | Age: 53
Discharge: HOME | End: 2023-06-14
Attending: NURSE PRACTITIONER
Payer: COMMERCIAL

## 2023-06-14 DIAGNOSIS — Z12.31 ENCOUNTER FOR SCREENING MAMMOGRAM FOR BREAST CANCER: ICD-10-CM

## 2023-06-14 PROCEDURE — 77063 BREAST TOMOSYNTHESIS BI: CPT | Performed by: NURSE PRACTITIONER

## 2023-06-14 PROCEDURE — 77067 SCR MAMMO BI INCL CAD: CPT | Performed by: NURSE PRACTITIONER

## 2023-10-05 ENCOUNTER — TELEPHONE (OUTPATIENT)
Dept: INTERNAL MEDICINE CLINIC | Facility: CLINIC | Age: 53
End: 2023-10-05

## 2023-10-05 NOTE — TELEPHONE ENCOUNTER
Rec call from patient. Hx of hernia surgery in jan of 202. States had 3 hernia repairs- large mesh placement. Has had no problems since. Since labor day, about a month has been experiencing fluttering below breasts above lower abdomen- stomach region. Explains feels like a butterfly. Has been occurring for a month on and off. Duration is short, less than 10 seconds. Occurs 4 or 5 times per day. Occurs more after eating. Denies chest pain that radiates to arm or jaw. Denies Sob or difficulty breathing. No lightheaded or dizziness. denies nausea or vomiting. Has experienced diarrhea couple times per week. Often associated with eating. Has been occurring for about past month. Eating and drinking okay. No blood in stool or urine. No fever. Has been experiencing headaches ever since rec COVID shot on and off since roughly April 2021. States does not feel like heart is racing or \"skipping a beat\". When pressing on stomach states feels uneasy. Bloated feels. Feels like something there. Patient states when pressing on it feels \"heartbeat\" like and stops. No abdominal pain really. Wakes up sometimes with pain but can be relieved sometimes by going to the bathroom. Rates the pain a 3/10. Spoke to provider. Patient had a us screening done of abd region that was \"fine\" per patient on 9/11. Scheduled patient for visit with provider tomorrow. Advised to go to ER for any chest pain, sob, abdominal pain, worsening symptoms, or any emergent matters. Verbalizes understanding. No additional questions.

## 2023-10-06 ENCOUNTER — HOSPITAL ENCOUNTER (OUTPATIENT)
Dept: GENERAL RADIOLOGY | Age: 53
Discharge: HOME | End: 2023-10-06
Attending: NURSE PRACTITIONER
Payer: COMMERCIAL

## 2023-10-06 ENCOUNTER — OFFICE VISIT (OUTPATIENT)
Dept: INTERNAL MEDICINE CLINIC | Facility: CLINIC | Age: 53
End: 2023-10-06
Payer: COMMERCIAL

## 2023-10-06 VITALS
WEIGHT: 244.38 LBS | OXYGEN SATURATION: 92 % | SYSTOLIC BLOOD PRESSURE: 128 MMHG | RESPIRATION RATE: 16 BRPM | HEART RATE: 81 BPM | DIASTOLIC BLOOD PRESSURE: 78 MMHG | BODY MASS INDEX: 41.72 KG/M2 | TEMPERATURE: 98 F | HEIGHT: 64 IN

## 2023-10-06 DIAGNOSIS — Z13.29 SCREENING FOR THYROID DISORDER: ICD-10-CM

## 2023-10-06 DIAGNOSIS — Z00.00 PHYSICAL EXAM: ICD-10-CM

## 2023-10-06 DIAGNOSIS — Z13.220 SCREENING FOR CHOLESTEROL LEVEL: ICD-10-CM

## 2023-10-06 DIAGNOSIS — R10.84 GENERALIZED ABDOMINAL PAIN: ICD-10-CM

## 2023-10-06 DIAGNOSIS — R10.84 GENERALIZED ABDOMINAL PAIN: Primary | ICD-10-CM

## 2023-10-06 DIAGNOSIS — K59.00 CONSTIPATION, UNSPECIFIED CONSTIPATION TYPE: ICD-10-CM

## 2023-10-06 PROCEDURE — 74018 RADEX ABDOMEN 1 VIEW: CPT | Performed by: NURSE PRACTITIONER

## 2023-10-06 PROCEDURE — 3078F DIAST BP <80 MM HG: CPT | Performed by: NURSE PRACTITIONER

## 2023-10-06 PROCEDURE — 99214 OFFICE O/P EST MOD 30 MIN: CPT | Performed by: NURSE PRACTITIONER

## 2023-10-06 PROCEDURE — 3008F BODY MASS INDEX DOCD: CPT | Performed by: NURSE PRACTITIONER

## 2023-10-06 PROCEDURE — 3074F SYST BP LT 130 MM HG: CPT | Performed by: NURSE PRACTITIONER

## 2023-11-21 ENCOUNTER — TELEMEDICINE (OUTPATIENT)
Dept: INTERNAL MEDICINE CLINIC | Facility: CLINIC | Age: 53
End: 2023-11-21
Payer: COMMERCIAL

## 2023-11-21 DIAGNOSIS — R05.8 PRODUCTIVE COUGH: ICD-10-CM

## 2023-11-21 DIAGNOSIS — J40 BRONCHITIS: Primary | ICD-10-CM

## 2023-11-21 RX ORDER — PREDNISONE 20 MG/1
40 TABLET ORAL DAILY
Qty: 14 TABLET | Refills: 0 | Status: SHIPPED | OUTPATIENT
Start: 2023-11-21 | End: 2023-11-28

## 2023-11-21 RX ORDER — AZITHROMYCIN 250 MG/1
TABLET, FILM COATED ORAL
Qty: 6 TABLET | Refills: 0 | Status: SHIPPED | OUTPATIENT
Start: 2023-11-21 | End: 2023-11-25

## 2023-11-21 RX ORDER — ALBUTEROL SULFATE 90 UG/1
2 AEROSOL, METERED RESPIRATORY (INHALATION) EVERY 4 HOURS PRN
Qty: 1 EACH | Refills: 0 | Status: SHIPPED | OUTPATIENT
Start: 2023-11-21

## 2023-11-21 NOTE — PROGRESS NOTES
Virtual video [address]00 [employer] verbally consents to a Virtual/video Check-In visit on 11/21/23. Patient has been referred to the [facility] website at [email or web address] to review the yearly Consent to Treat document. Patient understands and accepts financial responsibility for any deductible, co-insurance and/or co-pays associated with this service. Duration of the service: 15 minutes    Lan Rosales is a 48year old female. CHIEF COMPLAINT   Multiple symptoms    HPI:   Symptoms for weeks since 10/26. Had one abt and felt some better but worsened again. Chest congestion is hard to get out. Chills, poor appetite, fatigue, hacking productive cough. Chest tightness. No sob, fever, n/v, rashes, loss of smell or taste,           No current outpatient medications on file.       Past Medical History:   Diagnosis Date    Acne     Acne vulgaris     Arthralgia     \"severe arthralgic syndrome\"    Bilateral foot pain     feet pain    Blister of left wrist     Bronchitis     Cephalgia     Chest congestion     Corneal abrasion     left eye    Frequent sinus infections 07/2020    Hand pain     hands    Oligomenorrhea     Pharyngitis     Sinusitis     URI (upper respiratory infection)     Viral illness       Social History:  Social History     Socioeconomic History    Marital status:    Occupational History    Occupation: Marketing   Tobacco Use    Smoking status: Never    Smokeless tobacco: Never   Vaping Use    Vaping Use: Never used   Substance and Sexual Activity    Alcohol use: Yes     Comment: social    Drug use: No   Other Topics Concern    Caffeine Concern No     Comment: coffee, soda    Stress Concern No    Weight Concern Yes    Special Diet Yes     Comment: Low Carb    Exercise Yes     Comment: boot camp 2 days per week    Seat Belt Yes        REVIEW OF SYSTEMS:   See HPI    EXAM:   Limited due to video visit  GENERAL: does not sound like they are in any acute distress on the video  LUNGS: They are able to phonate clearly without pausing due to sob, there was frequent coughing during the visit. NEURO: Oriented times three      LABS:      Lab Results   Component Value Date    WBC 11.8 (H) 12/23/2020    RBC 5.42 (H) 12/23/2020    HGB 14.3 12/23/2020    HCT 43.6 12/23/2020    MCV 80.4 12/23/2020    MCH 26.4 12/23/2020    MCHC 32.8 12/23/2020    RDW 13.9 12/23/2020    .0 12/23/2020      Lab Results   Component Value Date    GLU 86 12/23/2020    BUN 15 12/23/2020    BUNCREA 18.3 12/23/2020    CREATSERUM 0.82 12/23/2020    ANIONGAP 5 12/23/2020    GFRNAA 85 12/23/2020    GFRAA 98 12/23/2020    CA 9.2 12/23/2020    OSMOCALC 282 12/23/2020    ALKPHO 92 12/23/2020    AST 8 (L) 12/23/2020    ALT 10 (L) 12/23/2020    BILT 0.4 12/23/2020    TP 7.6 12/23/2020    ALB 3.5 12/23/2020    GLOBULIN 4.1 12/23/2020     12/23/2020    K 4.0 12/23/2020     12/23/2020    CO2 25.0 12/23/2020      Lab Results   Component Value Date    CHOLEST 175 12/23/2020    TRIG 129 12/23/2020    HDL 36 (L) 12/23/2020     (H) 12/23/2020    VLDL 26 12/23/2020    NONHDLC 139 (H) 12/23/2020      Lab Results   Component Value Date    TSH 2.380 12/23/2020      No results found for: \"EAG\", \"A1C\"     IMAGING:     No results found. ASSESSMENT AND PLAN:   1. Bronchitis  2. Productive cough  - has productive cough for weeks, chest congestion and tightness. No SOB. - start steroid, abt, inhaler. Mon for SE.   - to ER for emergent symptoms  - supportive care with OTC meds  - predniSONE 20 MG Oral Tab; Take 2 tablets (40 mg total) by mouth daily for 7 days. Dispense: 14 tablet; Refill: 0  - azithromycin (ZITHROMAX Z-FELICIANO) 250 MG Oral Tab; Take 2 tablets (500 mg total) by mouth daily for 1 day, THEN 1 tablet (250 mg total) daily for 4 days. Dispense: 6 tablet; Refill: 0  - albuterol (PROAIR HFA) 108 (90 Base) MCG/ACT Inhalation Aero Soln;  Inhale 2 puffs into the lungs every 4 (four) hours as needed for Wheezing. Dispense: 1 each; Refill: 0        The patient indicates understanding of these issues and agrees to the plan. The patient is asked to return in 1 month for PE or sooner as needed. Please note that the following visit was completed using two-way, real-time interactive audio and/or video communication. This has been done in good yfn to provide continuity of care in the best interest of the provider-patient relationship, due to the ongoing public health crisis/national emergency and because of restrictions of visitation. There are limitations of this visit as no physical exam could be performed. Every conscious effort was taken to allow for sufficient and adequate time. This billing was spent on reviewing labs, medications, radiology tests and decision making. Appropriate medical decision-making and tests are ordered as detailed in the plan of care above.

## 2023-12-15 ENCOUNTER — OFFICE VISIT (OUTPATIENT)
Dept: INTERNAL MEDICINE CLINIC | Facility: CLINIC | Age: 53
End: 2023-12-15
Payer: COMMERCIAL

## 2023-12-15 ENCOUNTER — HOSPITAL ENCOUNTER (OUTPATIENT)
Dept: GENERAL RADIOLOGY | Age: 53
Discharge: HOME | End: 2023-12-15
Attending: NURSE PRACTITIONER
Payer: COMMERCIAL

## 2023-12-15 VITALS
SYSTOLIC BLOOD PRESSURE: 124 MMHG | HEART RATE: 84 BPM | WEIGHT: 246.63 LBS | HEIGHT: 64 IN | OXYGEN SATURATION: 98 % | BODY MASS INDEX: 42.11 KG/M2 | TEMPERATURE: 99 F | DIASTOLIC BLOOD PRESSURE: 68 MMHG | RESPIRATION RATE: 14 BRPM

## 2023-12-15 DIAGNOSIS — K59.00 CONSTIPATION, UNSPECIFIED CONSTIPATION TYPE: ICD-10-CM

## 2023-12-15 DIAGNOSIS — R05.8 PRODUCTIVE COUGH: Primary | ICD-10-CM

## 2023-12-15 DIAGNOSIS — R10.12 LEFT UPPER QUADRANT ABDOMINAL PAIN: ICD-10-CM

## 2023-12-15 DIAGNOSIS — R05.8 PRODUCTIVE COUGH: ICD-10-CM

## 2023-12-15 PROCEDURE — 99214 OFFICE O/P EST MOD 30 MIN: CPT | Performed by: NURSE PRACTITIONER

## 2023-12-15 PROCEDURE — 3078F DIAST BP <80 MM HG: CPT | Performed by: NURSE PRACTITIONER

## 2023-12-15 PROCEDURE — 3008F BODY MASS INDEX DOCD: CPT | Performed by: NURSE PRACTITIONER

## 2023-12-15 PROCEDURE — 71046 X-RAY EXAM CHEST 2 VIEWS: CPT | Performed by: NURSE PRACTITIONER

## 2023-12-15 PROCEDURE — 3074F SYST BP LT 130 MM HG: CPT | Performed by: NURSE PRACTITIONER

## 2023-12-15 RX ORDER — ACETAMINOPHEN 500 MG
500 TABLET ORAL EVERY 6 HOURS PRN
COMMUNITY

## 2023-12-15 RX ORDER — AMOXICILLIN AND CLAVULANATE POTASSIUM 875; 125 MG/1; MG/1
1 TABLET, FILM COATED ORAL 2 TIMES DAILY
Qty: 20 TABLET | Refills: 0 | Status: SHIPPED | OUTPATIENT
Start: 2023-12-15 | End: 2023-12-25

## 2023-12-15 NOTE — PATIENT INSTRUCTIONS
Get your chest xray done    Continue mucinex as needed    Take antibiotic completely as ordered     Take antibiotic with food    Eat yogurt twice a day while on antibiotic or take an oral probiotic    Monitor for diarrhea, side effects, allergic reaction    If you have a mild allergic reaction take benadryl and call the office. If it is severe and you have lip or throat swelling or trouble breathing go to the ER or call 911    See the surgeon for evaluation of the hernia repairs    See gastroenterology for the constipation that is ongoing    Get your labs done. You should be fasting for at least 10 hours. If you take a multivitamin with Biotin or any biotin product it should be held for 3 days prior to getting your labs done.      Follow up in 1 month for your physical or sooner as needed

## 2024-03-20 ENCOUNTER — OFFICE VISIT (OUTPATIENT)
Dept: INTERNAL MEDICINE CLINIC | Facility: CLINIC | Age: 54
End: 2024-03-20
Payer: COMMERCIAL

## 2024-03-20 VITALS
TEMPERATURE: 98 F | RESPIRATION RATE: 14 BRPM | HEIGHT: 64 IN | BODY MASS INDEX: 42.62 KG/M2 | DIASTOLIC BLOOD PRESSURE: 78 MMHG | WEIGHT: 249.63 LBS | HEART RATE: 83 BPM | SYSTOLIC BLOOD PRESSURE: 112 MMHG | OXYGEN SATURATION: 97 %

## 2024-03-20 DIAGNOSIS — E66.01 OBESITY, CLASS III, BMI 40-49.9 (MORBID OBESITY) (HCC): ICD-10-CM

## 2024-03-20 DIAGNOSIS — H10.13 ALLERGIC CONJUNCTIVITIS OF BOTH EYES: Primary | ICD-10-CM

## 2024-03-20 DIAGNOSIS — Z12.83 SCREENING FOR SKIN CANCER: ICD-10-CM

## 2024-03-20 PROCEDURE — 99214 OFFICE O/P EST MOD 30 MIN: CPT | Performed by: NURSE PRACTITIONER

## 2024-03-20 RX ORDER — OLOPATADINE HYDROCHLORIDE 2 MG/ML
1 SOLUTION/ DROPS OPHTHALMIC DAILY
Qty: 1 EACH | Refills: 0 | Status: SHIPPED | OUTPATIENT
Start: 2024-03-20

## 2024-03-20 NOTE — PROGRESS NOTES
Shirley Emerson is a 54 year old female.  CHIEF COMPLAINT   Multiple issues    HPI:   The patient has had an eye issue for a few weeks. Wakes up with flakes around both eyes and blurry vision. Has to wipe away the drainage. Almost feels like a film on the eye. No redness. No eye pain, sterile saline drops helped some. Changed all the makeup. Takes her makeup aff at night.    Would like to lose weight    Would like to see derm    Current Outpatient Medications   Medication Sig Dispense Refill    Carboxymethylcellulose Sodium (EYE DROPS OP) Apply to eye. Advanced Eye Wash      acetaminophen 500 MG Oral Tab Take 1 tablet (500 mg total) by mouth every 6 (six) hours as needed for Pain.      albuterol (PROAIR HFA) 108 (90 Base) MCG/ACT Inhalation Aero Soln Inhale 2 puffs into the lungs every 4 (four) hours as needed for Wheezing. 1 each 0      Past Medical History:   Diagnosis Date    Acne     Acne vulgaris     Arthralgia     \"severe arthralgic syndrome\"    Bilateral foot pain     feet pain    Blister of left wrist     Bronchitis     Cephalgia     Chest congestion     Corneal abrasion     left eye    Frequent sinus infections 07/2020    Hand pain     hands    Oligomenorrhea     Pharyngitis     Sinusitis     URI (upper respiratory infection)     Viral illness       Social History:  Social History     Socioeconomic History    Marital status:    Occupational History    Occupation: Marketing   Tobacco Use    Smoking status: Never    Smokeless tobacco: Never   Vaping Use    Vaping Use: Never used   Substance and Sexual Activity    Alcohol use: Yes     Comment: social    Drug use: No   Other Topics Concern    Caffeine Concern No     Comment: coffee, soda    Stress Concern No    Weight Concern Yes    Special Diet Yes     Comment: Low Carb    Exercise Yes     Comment: boot camp 2 days per week    Seat Belt Yes        REVIEW OF SYSTEMS:   See HPI     EXAM:     /78 (BP Location: Left arm, Patient Position: Sitting,  Cuff Size: large)   Pulse 83   Temp 98.3 °F (36.8 °C) (Temporal)   Resp 14   Ht 5' 4\" (1.626 m)   Wt 249 lb 9.6 oz (113.2 kg)   LMP 11/08/2023 (Approximate)   SpO2 97%   BMI 42.84 kg/m²   Body mass index is 42.84 kg/m².   GENERAL: well developed, well nourished,in no apparent distress  HEENT: atraumatic, normocephalic, no sinus tenderness, ears are clear.  Conjunctiva are clear.   LUNGS: clear to auscultation; no rhonchi, rales, or wheezing  CARDIO: RRR without murmur  GI: obese,  no tenderness  MUSCULOSKELETAL: No obvious joint deformity or swelling.  Normal gait.  EXTREMITIES: no edema  NEURO: Oriented times three,cranial nerves are grossly intact,motor and sensory are grossly intact      LABS:      Lab Results   Component Value Date    WBC 11.8 (H) 12/23/2020    RBC 5.42 (H) 12/23/2020    HGB 14.3 12/23/2020    HCT 43.6 12/23/2020    MCV 80.4 12/23/2020    MCH 26.4 12/23/2020    MCHC 32.8 12/23/2020    RDW 13.9 12/23/2020    .0 12/23/2020      Lab Results   Component Value Date    GLU 86 12/23/2020    BUN 15 12/23/2020    BUNCREA 18.3 12/23/2020    CREATSERUM 0.82 12/23/2020    ANIONGAP 5 12/23/2020    GFRNAA 85 12/23/2020    GFRAA 98 12/23/2020    CA 9.2 12/23/2020    OSMOCALC 282 12/23/2020    ALKPHO 92 12/23/2020    AST 8 (L) 12/23/2020    ALT 10 (L) 12/23/2020    BILT 0.4 12/23/2020    TP 7.6 12/23/2020    ALB 3.5 12/23/2020    GLOBULIN 4.1 12/23/2020     12/23/2020    K 4.0 12/23/2020     12/23/2020    CO2 25.0 12/23/2020      Lab Results   Component Value Date    CHOLEST 175 12/23/2020    TRIG 129 12/23/2020    HDL 36 (L) 12/23/2020     (H) 12/23/2020    VLDL 26 12/23/2020    NONHDLC 139 (H) 12/23/2020      Lab Results   Component Value Date    TSH 2.380 12/23/2020        ASSESSMENT AND PLAN:   1. Allergic conjunctivitis of both eyes  - has likely allergic rhinitis  - start pataday as needed.   - continue lubricant drops as needed  - see eye doc if no improvement  -  Ophthalmology Referral - In Network  - Olopatadine HCl 0.2 % Ophthalmic Solution; Apply 1 drop to eye daily.  Dispense: 1 each; Refill: 0    2. Obesity, Class III, BMI 40-49.9 (morbid obesity) ([facility])  - lifestyle changes discussed in detail    3. Screening for skin cancer  - see derm  - DERM - INTERNAL      The patient indicates understanding of these issues and agrees to the plan.  The patient is asked to return in 1 month for PE or sooner as needed.

## 2024-03-20 NOTE — PATIENT INSTRUCTIONS
Start the pataday.     See the eye doc as needed.    My fitness pal victor manuel for nutrition     See dermatology    Follow up in 1 month or sooner as needed

## 2024-03-28 ENCOUNTER — TELEPHONE (OUTPATIENT)
Dept: INTERNAL MEDICINE CLINIC | Facility: CLINIC | Age: 54
End: 2024-03-28

## 2024-03-28 NOTE — TELEPHONE ENCOUNTER
Outreach was made for overdue screening colon cancer, pap and breast cancer LVM informing pt to call to schedule an office visit and sent [employer] message.

## 2025-04-09 ENCOUNTER — HOSPITAL ENCOUNTER (OUTPATIENT)
Age: 55
Discharge: HOME | End: 2025-04-09
Payer: COMMERCIAL

## 2025-04-09 ENCOUNTER — APPOINTMENT (OUTPATIENT)
Dept: GENERAL RADIOLOGY | Age: 55
End: 2025-04-09
Attending: NURSE PRACTITIONER
Payer: COMMERCIAL

## 2025-04-09 VITALS
DIASTOLIC BLOOD PRESSURE: 69 MMHG | HEIGHT: 64 IN | WEIGHT: 240 LBS | HEART RATE: 91 BPM | OXYGEN SATURATION: 98 % | SYSTOLIC BLOOD PRESSURE: 149 MMHG | RESPIRATION RATE: 18 BRPM | BODY MASS INDEX: 40.97 KG/M2 | TEMPERATURE: 100 F

## 2025-04-09 DIAGNOSIS — J06.9 VIRAL URI WITH COUGH: Primary | ICD-10-CM

## 2025-04-09 LAB
POCT INFLUENZA A: NEGATIVE
POCT INFLUENZA B: NEGATIVE
SARS-COV-2 RNA RESP QL NAA+PROBE: NOT DETECTED

## 2025-04-09 PROCEDURE — 99204 OFFICE O/P NEW MOD 45 MIN: CPT

## 2025-04-09 PROCEDURE — 71046 X-RAY EXAM CHEST 2 VIEWS: CPT | Performed by: NURSE PRACTITIONER

## 2025-04-09 PROCEDURE — 87502 INFLUENZA DNA AMP PROBE: CPT | Performed by: NURSE PRACTITIONER

## 2025-04-09 PROCEDURE — 99214 OFFICE O/P EST MOD 30 MIN: CPT

## 2025-04-09 RX ORDER — CODEINE PHOSPHATE AND GUAIFENESIN 10; 100 MG/5ML; MG/5ML
10 SOLUTION ORAL NIGHTLY PRN
Qty: 118 ML | Refills: 0 | Status: SHIPPED | OUTPATIENT
Start: 2025-04-09

## 2025-04-09 RX ORDER — BENZONATATE 100 MG/1
100 CAPSULE ORAL 3 TIMES DAILY PRN
Qty: 30 CAPSULE | Refills: 0 | Status: SHIPPED | OUTPATIENT
Start: 2025-04-09 | End: 2025-05-09

## 2025-04-09 NOTE — ED INITIAL ASSESSMENT (HPI)
Pt here c/o cough, sinus pain, runny nose, congestion since Sunday.   Denies fever.   Having chills.   Denies n/v.   Denies sob/chest pain.   Bilateral rib pain when coughing

## 2025-04-09 NOTE — DISCHARGE INSTRUCTIONS
Please take Tessalon Perles 3 times a day for coughing.  Cough medicine with codeine at nighttime only.  This medication can make you dizzy and drowsy.  No drinking alcohol or driving motor vehicles.  Please start daily antihistamines like Claritin or Zyrtec with Flonase nasal spray.  If you are still experiencing sinus pain and pressure with nasal congestion over the next 3 to 4 days please start antibiotics as prescribed.  PCP follow-up as needed.

## 2025-04-09 NOTE — ED PROVIDER NOTES
Patient Seen in: [facility]      History     Chief Complaint   Patient presents with    Cough     Running nose, congestion, cough.  My cough is persistent, to the point that my ribs hurt. - Entered by patient     Stated Complaint: Cough - Running nose, congestion, cough.  My cough is persistent, to the point *    Subjective:   This is a 55-year-old female with below stated medical history.  Presents to immediate care for URI symptoms.  Patient reports rhinorrhea, nasal congestion, and cough.  Reports anterior rib pain with coughing.  Chills but no fevers.  No shortness of breath or wheezing.  Taking Robitussin DM and Tylenol with some relief.    The history is provided by the patient.             Objective:     Past Medical History:    Acne    Acne vulgaris    Arthralgia    \"severe arthralgic syndrome\"    Bilateral foot pain    feet pain    Blister of left wrist    Bronchitis    Cephalgia    Chest congestion    Corneal abrasion    left eye    Frequent sinus infections    Hand pain    hands    Oligomenorrhea    Pharyngitis    Sinusitis    URI (upper respiratory infection)    Viral illness              Past Surgical History:   Procedure Laterality Date    D & c      Hernia surgery  01/22/2021    x 3 Hernias at once    Needle biopsy right  9/6/2012     BENIGN                No pertinent social history.            Review of Systems   Constitutional:  Positive for chills. Negative for fever.   HENT:  Positive for congestion and rhinorrhea. Negative for ear discharge, ear pain and sore throat.    Respiratory:  Positive for cough. Negative for shortness of breath and wheezing.    Cardiovascular:  Negative for chest pain, palpitations and leg swelling.   Gastrointestinal:  Negative for abdominal pain, constipation, diarrhea and vomiting.   Genitourinary:  Negative for dysuria.   Musculoskeletal:  Negative for back pain, neck pain and neck stiffness.   Skin:  Negative for rash.   Neurological:  Negative for  headaches.       Positive for stated complaint: Cough - Running nose, congestion, cough.  My cough is persistent, to the point *  Other systems are as noted in HPI.  Constitutional and vital signs reviewed.      All other systems reviewed and negative except as noted above.    Physical Exam     ED Triage Vitals   BP 04/09/25 1337 149/69   Pulse 04/09/25 1337 91   Resp 04/09/25 1337 18   Temp 04/09/25 1337 99.7 °F (37.6 °C)   Temp src 04/09/25 1337 Oral   SpO2 04/09/25 1356 98 %   O2 Device 04/09/25 1356 None (Room air)       Current Vitals:   Vital Signs  BP: 149/69  Pulse: 91  Resp: 18  Temp: 99.7 °F (37.6 °C)  Temp src: Oral    Oxygen Therapy  SpO2: 98 %  O2 Device: None (Room air)        Physical Exam  Vitals and nursing note reviewed.   Constitutional:       General: She is not in acute distress.     Appearance: Normal appearance. She is not ill-appearing, toxic-appearing or diaphoretic.   HENT:      Head: Normocephalic and atraumatic.      Right Ear: Tympanic membrane, ear canal and external ear normal. There is no impacted cerumen.      Left Ear: Tympanic membrane, ear canal and external ear normal. There is no impacted cerumen.      Nose: Congestion and rhinorrhea present.      Mouth/Throat:      Mouth: Mucous membranes are moist.      Pharynx: Oropharynx is clear. No oropharyngeal exudate or posterior oropharyngeal erythema.   Eyes:      General:         Right eye: No discharge.         Left eye: No discharge.      Extraocular Movements: Extraocular movements intact.      Conjunctiva/sclera: Conjunctivae normal.   Cardiovascular:      Rate and Rhythm: Normal rate and regular rhythm.      Heart sounds: Normal heart sounds. No murmur heard.  Pulmonary:      Effort: Pulmonary effort is normal. No respiratory distress.      Breath sounds: Normal breath sounds. No stridor. No wheezing, rhonchi or rales.   Musculoskeletal:      Cervical back: Neck supple.      Right lower leg: No edema.      Left lower leg: No  edema.   Skin:     General: Skin is warm and dry.      Capillary Refill: Capillary refill takes less than 2 seconds.      Findings: No rash.   Neurological:      Mental Status: She is alert and oriented to person, place, and time.   Psychiatric:         Mood and Affect: Mood normal.         Behavior: Behavior normal.             ED Course     Labs Reviewed   RAPID SARS-COV-2 BY PCR - Normal   POCT FLU TEST - Normal    Narrative:     This assay is a rapid molecular in vitro test utilizing nucleic acid amplification of influenza A and B viral RNA.            Rapid flu, rapid covid, chest x ray       MDM      Vital signs stable. Patient is well-appearing and nontoxic looking. Presents to immediate care for upper respiratory symptoms.    Differential diagnosis include but not limited to COVID, viral sinusitis, influenza, bronchitis, other viral URI, pneumonia     Lung sounds clear bilaterally. No respiratory distress or hypoxia.  Chest x-ray films interpreted and reviewed myself.  Results show no acute findings.       Rapid covid and rapid flu are negative.    Clinical impression is viral URI with cough.    Patient states she is gets frequent sinus infections symptoms.  Watchful waiting prescription for Augmentin given if sinus symptoms continue past day 7 symptoms.    DC home.  Rx given for Tessalon Perles and guaifenesin with codeine.  Advised to take cough medicine only at nighttime.  Recommended over-the-counter antihistamines and Flonase. Tylenol and/or ibuprofen for pain or fever. The importance of oral hydration and close follow-up with primary provider in 1 week discussed. Reasons to seek emergent treatment reinforced. Patient verbalized understanding, and agreed with plan of care. All questions answered.          Medical Decision Making      Disposition and Plan     Clinical Impression:  1. Viral URI with cough         Disposition:  Discharge  4/9/2025  2:28 pm    Follow-up:  No follow-up provider  specified.        Medications Prescribed:  Current Discharge Medication List        START taking these medications    Details   amoxicillin clavulanate 875-125 MG Oral Tab Take 1 tablet by mouth 2 (two) times daily for 10 days.  Qty: 20 tablet, Refills: 0      benzonatate 100 MG Oral Cap Take 1 capsule (100 mg total) by mouth 3 (three) times daily as needed for cough.  Qty: 30 capsule, Refills: 0      guaiFENesin-codeine 100-10 MG/5ML Oral Solution Take 10 mL by mouth nightly as needed for cough.  Qty: 118 mL, Refills: 0    Associated Diagnoses: Viral URI with cough                 Supplementary Documentation:

## 2025-04-18 ENCOUNTER — APPOINTMENT (OUTPATIENT)
Dept: GENERAL RADIOLOGY | Age: 55
End: 2025-04-18
Attending: EMERGENCY MEDICINE
Payer: COMMERCIAL

## 2025-04-18 ENCOUNTER — HOSPITAL ENCOUNTER (OUTPATIENT)
Age: 55
Discharge: HOME | End: 2025-04-18
Attending: EMERGENCY MEDICINE
Payer: COMMERCIAL

## 2025-04-18 VITALS
TEMPERATURE: 99 F | RESPIRATION RATE: 20 BRPM | DIASTOLIC BLOOD PRESSURE: 90 MMHG | HEIGHT: 64 IN | OXYGEN SATURATION: 97 % | SYSTOLIC BLOOD PRESSURE: 126 MMHG | WEIGHT: 240 LBS | HEART RATE: 96 BPM | BODY MASS INDEX: 40.97 KG/M2

## 2025-04-18 DIAGNOSIS — J40 BRONCHITIS: ICD-10-CM

## 2025-04-18 DIAGNOSIS — J01.90 ACUTE SINUSITIS, RECURRENCE NOT SPECIFIED, UNSPECIFIED LOCATION: Primary | ICD-10-CM

## 2025-04-18 PROCEDURE — 99213 OFFICE O/P EST LOW 20 MIN: CPT

## 2025-04-18 PROCEDURE — 71046 X-RAY EXAM CHEST 2 VIEWS: CPT | Performed by: EMERGENCY MEDICINE

## 2025-04-18 RX ORDER — CEFDINIR 300 MG/1
300 CAPSULE ORAL 2 TIMES DAILY
Qty: 20 CAPSULE | Refills: 0 | Status: SHIPPED | OUTPATIENT
Start: 2025-04-18 | End: 2025-04-28

## 2025-04-18 RX ORDER — AZITHROMYCIN 250 MG/1
TABLET, FILM COATED ORAL
Qty: 6 TABLET | Refills: 0 | Status: SHIPPED | OUTPATIENT
Start: 2025-04-18 | End: 2025-04-23

## 2025-04-18 NOTE — DISCHARGE INSTRUCTIONS
Stop augmentin at home  Encourage fluids at home  Return to the ER if symptoms worsen or if any other problems arise

## 2025-04-18 NOTE — ED INITIAL ASSESSMENT (HPI)
Seen here 4/ and Dx with viral URI with cough. Rx Tessalon Perles and guaifenesin with codeine; instructed to take Augmentin if symptoms continue after 3-4 days.    Here today with persistent productive cough and nasal congestion.

## 2025-04-18 NOTE — ED PROVIDER NOTES
Patient Seen in: [facility]      History     Chief Complaint   Patient presents with    Cough/URI     Stated Complaint: Cough - I am still experiencing a heavy frequent cough with mucus and nasal con*    Subjective:   HPI    Patient is a 55-year-old female presents to the immediate care with a history of cough and cold symptoms ongoing for the last approximately 2 weeks.  The patient was seen here at the immediate care on the ninth, 10 days prior to arrival in the immediate care at that time had an x-ray done of the chest was found to be negative.  Patient also tested for COVID and flu at that time both of which found to be negative.  The patient continues to have productive cough at this time despite the use of Tessalon Perles and Augmentin at home.  The patient states she recently traveled to [place] and had 2 other members of her trip also get sick with similar symptoms.  The patient continues to have cough productive of some thick yellow sputum at home.  The patient denies history of any vomiting or diarrhea.  Patient has had ongoing sinus congestion associated with symptoms as well.  The patient denies history of any other somatic complaints or discomfort at this time.  History of Present Illness               Objective:     Past Medical History:    Acne    Acne vulgaris    Arthralgia    \"severe arthralgic syndrome\"    Bilateral foot pain    feet pain    Blister of left wrist    Bronchitis    Cephalgia    Chest congestion    Corneal abrasion    left eye    Frequent sinus infections    Hand pain    hands    Oligomenorrhea    Pharyngitis    Sinusitis    URI (upper respiratory infection)    Viral illness              Past Surgical History:   Procedure Laterality Date    D & c      Hernia surgery  01/22/2021    x 3 Hernias at once    Needle biopsy right  9/6/2012     BENIGN                Social History     Socioeconomic History    Marital status:    Occupational History    Occupation:  Marketing   Tobacco Use    Smoking status: Never    Smokeless tobacco: Never   Vaping Use    Vaping status: Never Used   Substance and Sexual Activity    Alcohol use: Yes     Comment: social    Drug use: No   Other Topics Concern    Caffeine Concern No     Comment: coffee, soda    Stress Concern No    Weight Concern Yes    Special Diet Yes     Comment: Low Carb    Exercise Yes     Comment: boot camp 2 days per week    Seat Belt Yes              Review of Systems    Positive for stated complaint: Cough - I am still experiencing a heavy frequent cough with mucus and nasal con*  Other systems are as noted in HPI.  Constitutional and vital signs reviewed.      All other systems reviewed and negative except as noted above.                  Physical Exam     ED Triage Vitals [04/18/25 1216]   /90   Pulse 96   Resp 20   Temp 98.5 °F (36.9 °C)   Temp src Oral   SpO2 97 %   O2 Device None (Room air)       Current Vitals:   Vital Signs  BP: 126/90  Pulse: 96  Resp: 20  Temp: 98.5 °F (36.9 °C)  Temp src: Oral    Oxygen Therapy  SpO2: 97 %  O2 Device: None (Room air)        Physical Exam     Physical Exam         GENERAL: Well-developed, well-nourished female sitting up breathing easily in no apparent distress.  Patient is nontoxic in appearance.  HEENT: Head is normocephalic, atraumatic. Pupils are 4 mm equally round and reactive to light. Oropharynx is clear. Mucous membranes are moist.  There is mild tenderness over maxillary sinuses bilaterally.  NECK: No stridor.  LUNGS: Clear to auscultation bilaterally with no wheeze. There is good equal air entry bilaterally.  HEART: Regular rate and rhythm. Normal S1, S2 no S3, or S4. No murmur.  NEURO: Patient is awake, alert and oriented to time place and person. Motor strength is 5 over 5 in all 4 extremities. There are no gross motor or sensory deficits appreciated.  Patient is up and ambulatory in the immediate care with a steady gait and no ataxia.            ED Course    Labs Reviewed - No data to display       Results            XR CHEST PA + LAT CHEST (OLC=[id number])  Result Date: 4/18/2025  CONCLUSION:  No acute disease.    LOCATION:  [facility]   Dictated by (CST): Michael Bell MD on 4/18/2025 at 12:34 PM     Finalized by (CST): Michael Bell MD on 4/18/2025 at 12:35 PM                            MDM     Patient with chest x-ray as noted above.  Patient sitting back breathing easily in no apparent distress.  Patient symptoms may be viral in etiology however she will be treated with a different and more appropriate antibiotic at this time.  Instructions to rest at home and to encourage fluids at home and to return to the ER immediately if symptoms worsen or if any other problems arise.  Patient will be asked to stop Augmentin at this time.  Patient discharged home at this time        Medical Decision Making      Disposition and Plan     Clinical Impression:  1. Acute sinusitis, recurrence not specified, unspecified location    2. Bronchitis         Disposition:  Discharge  4/18/2025 12:44 pm    Follow-up:  Elo Mary MD  [address] [address] [phone]    In 2 days            Medications Prescribed:  Current Discharge Medication List        START taking these medications    Details   cefdinir 300 MG Oral Cap Take 1 capsule (300 mg total) by mouth 2 (two) times daily for 10 days.  Qty: 20 capsule, Refills: 0      azithromycin (ZITHROMAX Z-FELICIANO) 250 MG Oral Tab 500 mg once followed by 250 mg daily x 4 days  Qty: 6 tablet, Refills: 0             Supplementary Documentation:

## (undated) DEVICE — VISUALIZATION SYSTEM: Brand: CLEARIFY

## (undated) DEVICE — REDUCER: Brand: ENDOWRIST

## (undated) DEVICE — MONOPOLAR CURVED SCISSORS: Brand: ENDOWRIST

## (undated) DEVICE — 40580 - THE PINK PAD - ADVANCED TRENDELENBURG POSITIONING KIT: Brand: 40580 - THE PINK PAD - ADVANCED TRENDELENBURG POSITIONING KIT

## (undated) DEVICE — COLUMN DRAPE

## (undated) DEVICE — STERILE POLYISOPRENE POWDER-FREE SURGICAL GLOVES: Brand: PROTEXIS

## (undated) DEVICE — SUTURE VLOC 90 2-0 9\" 2145

## (undated) DEVICE — SUTURE VLOC 180 0 12\" 0316

## (undated) DEVICE — COVER WAND RF DETECT

## (undated) DEVICE — TROCAR: Brand: KII FIOS FIRST ENTRY

## (undated) DEVICE — Device

## (undated) DEVICE — TIP COVER ACCESSORY

## (undated) DEVICE — FENESTRATED BIPOLAR FORCEPS: Brand: ENDOWRIST

## (undated) DEVICE — SUTURE VLOC 90 3-0 9\" 0644

## (undated) DEVICE — BLADELESS OBTURATOR: Brand: WECK VISTA

## (undated) DEVICE — SUTURE MONOCRYL 4-0 PS-2

## (undated) DEVICE — MEGA SUTURECUT ND: Brand: ENDOWRIST

## (undated) DEVICE — SEAL

## (undated) DEVICE — CANNULA SEAL

## (undated) DEVICE — ARM DRAPE

## (undated) DEVICE — ROBOTIC GENERAL: Brand: MEDLINE INDUSTRIES, INC.

## (undated) NOTE — ED AVS SNAPSHOT
Jovanny Louise   MRN: [id number]    Department:  [facility] [place]   Date of Visit:  11/29/2018           Disclosure     Insurance plans vary and the physician(s) referred by the ER may not be covered by your plan.  Please con tell this physician (or your personal doctor if your instructions are to return to your personal doctor) about any new or lasting problems. The primary care or specialist physician will see patients referred from the [facility] Emergency Department.  Owen Bradley

## (undated) NOTE — LETTER
3/28/2024       Shirley Emerson   [address] [address]      Dear Shirley,    IF YOU ARE 50 YEARS OF AGE OR OLDER, COLORECTAL CANCER SCREENING CAN SAVE YOUR LIFE.    As your primary care doctor, I want to do everything I can to protect your health.  Colorectal cancer is the second leading cause of cancer-related deaths in the [place].  Polyps and colorectal cancer do not always cause symptoms.  That's why screening is so important.  Regular screening can find colorectal cancer early when it is most curable.  These tests can help prevent the development of colorectal cancer.  All adults 50 and older should have one of the following colon cancer screenings as recommended by the American Cancer Society:    Colonoscopy every ten years or as advised by your physician  iFOBT every year (The iFOBT is a home test to detect blood in the stool. The test is quick, easy and requires no dietary restrictions.)    Please contact my office today so together we can determine which colorectal cancer screening is best for you.  Or, if you have already had one of the above colon cancer screenings, please let me know the date, method of screening, physician and/or facility that performed the screening so I can update your medical record.      If you have a history of colon cancer, colon polyps, or an abnormal colon screening result, please follow the instructions you have previously received from myself or your specialists.    There are no excuses to ignore early detection!  This is one cancer you can prevent.  Regular exams and preventive screenings are among the most important things you can do.  Thank you for taking time to take care of yourself.         Bernie Slater, APRN   [phone]

## (undated) NOTE — LETTER
3/28/2024  Shirley Emerson [birth year]  [address] [facility] [address]    We take each of our patient's health very seriously and the key to maintaining your health is an annual wellness physical.  Review of your medical records shows that it is time for your annual wellness exam.  Please contact my office at your earliest convenience to schedule this appointment at (office phone)  Thank You    Bernie Slater APRN   PH: [phone]

## (undated) NOTE — MR AVS SNAPSHOT
After Visit Summary   2/1/2021    Sancho Allen    MRN: [id number]           Visit Information     Date & Time  2/1/2021  3:20 PM Provider  Tomer Washburn MD [facility] Dept.  Phone  630- Imaging Scheduling Instructions     Around February 1, 2021   Imaging:   [facility] ANDREW 2D+3D SCREENING BILAT (FMU=[id number])    Instructions: To schedule an appointment for your radiology test please call [address] Scheduling at [phone]. or injury that are   non-life-threatening.   Also available by appointment     Average cost  $70*       [address]   Monday – Friday  10:00 am – 10:00 pm   Saturday – Sunday  1